# Patient Record
Sex: MALE | Race: WHITE | NOT HISPANIC OR LATINO | Employment: FULL TIME | URBAN - METROPOLITAN AREA
[De-identification: names, ages, dates, MRNs, and addresses within clinical notes are randomized per-mention and may not be internally consistent; named-entity substitution may affect disease eponyms.]

---

## 2017-04-05 ENCOUNTER — HISTORICAL (OUTPATIENT)
Dept: ADMINISTRATIVE | Facility: HOSPITAL | Age: 52
End: 2017-04-05

## 2017-04-21 LAB
ALBUMIN SERPL-MCNC: 4.5 G/DL (ref 3.1–4.7)
ALP SERPL-CCNC: 80 IU/L (ref 40–104)
ALT (SGPT): 42 IU/L (ref 3–33)
AST SERPL-CCNC: 32 IU/L (ref 10–40)
BACTERIA SPEC CULT: ABNORMAL
BASOPHILS NFR BLD: 0 K/UL (ref 0–0.2)
BASOPHILS NFR BLD: 0.4 %
BILIRUB SERPL-MCNC: 0.7 MG/DL (ref 0.3–1)
BUN SERPL-MCNC: 11 MG/DL (ref 8–20)
CALCIUM SERPL-MCNC: 9.3 MG/DL (ref 7.7–10.4)
CHLORIDE: 98 MMOL/L (ref 98–110)
CO2 SERPL-SCNC: 29.8 MMOL/L (ref 22.8–31.6)
CREATININE: 0.99 MG/DL (ref 0.6–1.4)
EOSINOPHIL NFR BLD: 0.2 K/UL (ref 0–0.7)
EOSINOPHIL NFR BLD: 2.7 %
ERYTHROCYTE [DISTWIDTH] IN BLOOD BY AUTOMATED COUNT: 11.9 % (ref 11.7–14.9)
GLUCOSE: 97 MG/DL (ref 70–99)
GRAN #: 5.6 K/UL (ref 1.4–6.5)
GRAN%: 61.9 %
HCT VFR BLD AUTO: 42.8 % (ref 39–55)
HGB BLD-MCNC: 14.8 G/DL (ref 14–16)
IMMATURE GRANS (ABS): 0 K/UL (ref 0–1)
IMMATURE GRANULOCYTES: 0.3 %
LYMPH #: 2.4 K/UL (ref 1.2–3.4)
LYMPH%: 26.3 %
MCH RBC QN AUTO: 32.2 PG (ref 25–35)
MCHC RBC AUTO-ENTMCNC: 34.6 G/DL (ref 31–36)
MCV RBC AUTO: 93 FL (ref 80–100)
MONO #: 0.8 K/UL (ref 0.1–0.6)
MONO%: 8.4 %
NUCLEATED RBCS: 0 %
PLATELET # BLD AUTO: 240 K/UL (ref 140–440)
PMV BLD AUTO: 9.7 FL (ref 8.8–12.7)
POTASSIUM SERPL-SCNC: 3.5 MMOL/L (ref 3.5–5)
PROT SERPL-MCNC: 7.9 G/DL (ref 6–8.2)
RBC # BLD AUTO: 4.6 M/UL (ref 4.3–5.9)
RBC # BLD AUTO: ABNORMAL /HPF
SODIUM: 140 MMOL/L (ref 134–144)
SQUAMOUS EPITHELIAL, UA: ABNORMAL
WBC # BLD AUTO: 9 K/UL (ref 5–10)
WBC # BLD: ABNORMAL /HPF

## 2017-05-15 ENCOUNTER — TELEPHONE (OUTPATIENT)
Dept: ORTHOPEDICS | Facility: CLINIC | Age: 52
End: 2017-05-15

## 2017-05-15 NOTE — TELEPHONE ENCOUNTER
----- Message from Elicia Bustamante sent at 5/15/2017 11:05 AM CDT -----  Contact: PATIENT  PLEASE CALL PATIENT TO SCHEDULE APPOINTMENT FOR Wednesday TO HAVE STITCHES REMOVED PHONE# [phone]

## 2017-05-19 ENCOUNTER — OFFICE VISIT (OUTPATIENT)
Dept: ORTHOPEDICS | Facility: CLINIC | Age: 52
End: 2017-05-19
Payer: OTHER GOVERNMENT

## 2017-05-19 VITALS
WEIGHT: 184 LBS | SYSTOLIC BLOOD PRESSURE: 140 MMHG | DIASTOLIC BLOOD PRESSURE: 82 MMHG | HEIGHT: 66 IN | BODY MASS INDEX: 29.57 KG/M2

## 2017-05-19 DIAGNOSIS — M77.10 LATERAL EPICONDYLITIS  OF ELBOW: Primary | ICD-10-CM

## 2017-05-19 PROCEDURE — 99024 POSTOP FOLLOW-UP VISIT: CPT | Mod: ,,, | Performed by: ORTHOPAEDIC SURGERY

## 2017-05-19 RX ORDER — TRAZODONE HYDROCHLORIDE 50 MG/1
1 TABLET ORAL NIGHTLY
COMMUNITY
Start: 2017-03-29

## 2017-05-19 RX ORDER — ESOMEPRAZOLE MAGNESIUM 40 MG/1
1 CAPSULE, DELAYED RELEASE ORAL DAILY
COMMUNITY
Start: 2017-03-29

## 2017-05-19 NOTE — PROGRESS NOTES
Subjective:     Chief Complaint  Chief Complaint   Patient presents with    Right Elbow - Post-op Evaluation, Pain     DOS: 04/27/2017, 3 weeks 1 day; Extensor Tendon repair patient in a cast.       HPI  Flynn Harden is a 51 y.o.  male who 3 weeks status post extensor tendon repair of the right elbow. His incision is healing well the cast was removed and he will be placed in a short arm cast for 3 weeks patient's been encouraged on elbow range of motion exercises      Review of Systems     Constitutional: Negative    HENT: Negative  Eyes: Negative  Respiratory: Negative  Cardiovascular: Negative  Musculoskeletal: HPI  Skin: Negative  Neurological: Negative  Hematological: Negative  Endocrine: Negative      Physical Exam    Vitals:    05/19/17 0859   BP: (!) 140/82     Physical Examination:     General appearance -  well appearing, and in no distress  Mental status - awake  Neck - supple  Chest -  symmetric air entry  Heart - normal rate   Abdomen - soft    Past Medical History  Past Medical History:   Diagnosis Date    Arthritis     back L4- L5, knee, rt foot with fractures    Cancer     basal cell       Past Surgical History  Past Surgical History:   Procedure Laterality Date    CARPAL TUNNEL RELEASE      CARPAL TUNNEL RELEASE      qubital tunnel      SHOULDER ARTHROSCOPY W/ ROTATOR CUFF REPAIR      TARSAL NAVICULAR ARTHODESIS      ULNAR TUNNEL RELEASE      VASECTOMY         Medications  Current Outpatient Prescriptions   Medication Sig    NEXIUM 40 mg capsule Take 1 tablet by mouth once daily.    trazodone (DESYREL) 50 MG tablet Take 1 tablet by mouth nightly.     No current facility-administered medications for this visit.        Allergies  Review of patient's allergies indicates:  No Known Allergies        Assessment/Plan   Lateral epicondylitis  of elbow

## 2017-05-19 NOTE — MR AVS SNAPSHOT
"    [facility] includes the [facility]  [address]  [address]  [place] [address]  Phone: [phone]  Fax: [phone]                  Flynn Harden   2017 8:30 AM   Office Visit    Description:  Male : 1965   Provider:  Michael Drummond MD   Department:  [facility]           Reason for Visit     Right Elbow - Post-op Evaluation, Pain           Diagnoses this Visit        Comments    Lateral epicondylitis  of elbow    -  Primary            To Do List           Future Appointments        Provider Department Dept Phone    2017 8:40 AM Michael Drummond MD [facility] [phone]      Goals (5 Years of Data)     None      Follow-Up and Disposition     Return in about 3 weeks (around 2017).    Follow-up and Disposition History           Medications           Message regarding Medications     Verify the changes and/or additions to your medication regime listed below are the same as discussed with your clinician today.  If any of these changes or additions are incorrect, please notify your healthcare provider.             Verify that the below list of medications is an accurate representation of the medications you are currently taking.  If none reported, the list may be blank. If incorrect, please contact your healthcare provider. Carry this list with you in case of emergency.           Current Medications     NEXIUM 40 mg capsule Take 1 tablet by mouth once daily.    trazodone (DESYREL) 50 MG tablet Take 1 tablet by mouth nightly.           Clinical Reference Information           Your Vitals Were     BP Height Weight BMI       140/82 (BP Location: Left arm, Patient Position: Sitting, BP Method: Manual) 5' 6" (1.676 m) 83.5 kg (184 lb) 29.7 kg/m2       Blood Pressure          Most Recent Value    BP  (!)  140/82      Allergies as of 2017     No Known Allergies      Immunizations Administered on Date of Encounter - " 5/19/2017     None

## 2017-06-09 ENCOUNTER — OFFICE VISIT (OUTPATIENT)
Dept: ORTHOPEDICS | Facility: CLINIC | Age: 52
End: 2017-06-09
Payer: OTHER GOVERNMENT

## 2017-06-09 VITALS
WEIGHT: 184 LBS | HEART RATE: 95 BPM | SYSTOLIC BLOOD PRESSURE: 148 MMHG | DIASTOLIC BLOOD PRESSURE: 80 MMHG | BODY MASS INDEX: 29.57 KG/M2 | HEIGHT: 66 IN

## 2017-06-09 DIAGNOSIS — M77.10 LATERAL EPICONDYLITIS  OF ELBOW: Primary | ICD-10-CM

## 2017-06-09 PROCEDURE — 99024 POSTOP FOLLOW-UP VISIT: CPT | Mod: ,,, | Performed by: ORTHOPAEDIC SURGERY

## 2017-07-21 ENCOUNTER — OFFICE VISIT (OUTPATIENT)
Dept: ORTHOPEDICS | Facility: CLINIC | Age: 52
End: 2017-07-21
Payer: OTHER GOVERNMENT

## 2017-07-21 VITALS
HEART RATE: 115 BPM | HEIGHT: 66 IN | WEIGHT: 184 LBS | SYSTOLIC BLOOD PRESSURE: 135 MMHG | DIASTOLIC BLOOD PRESSURE: 84 MMHG | BODY MASS INDEX: 29.57 KG/M2

## 2017-07-21 DIAGNOSIS — M75.111 INCOMPLETE TEAR OF RIGHT ROTATOR CUFF: ICD-10-CM

## 2017-07-21 DIAGNOSIS — M77.10 LATERAL EPICONDYLITIS  OF ELBOW: Primary | ICD-10-CM

## 2017-07-21 PROCEDURE — 99024 POSTOP FOLLOW-UP VISIT: CPT | Mod: ,,, | Performed by: ORTHOPAEDIC SURGERY

## 2017-07-21 NOTE — PROGRESS NOTES
Past Medical History:   Diagnosis Date    Arthritis     back L4- L5, knee, rt foot with fractures    Cancer     basal cell       Past Surgical History:   Procedure Laterality Date    CARPAL TUNNEL RELEASE      CARPAL TUNNEL RELEASE      ELBOW SURGERY Right 04/27/2017    Extensor Tendon Repair    EXTENSOR TENDON OF FOREARM / WRIST REPAIR Right 04/27/2017    qubital tunnel      SHOULDER ARTHROSCOPY W/ ROTATOR CUFF REPAIR      TARSAL NAVICULAR ARTHODESIS      ULNAR TUNNEL RELEASE      VASECTOMY         Current Outpatient Prescriptions   Medication Sig    NEXIUM 40 mg capsule Take 1 tablet by mouth once daily.    trazodone (DESYREL) 50 MG tablet Take 1 tablet by mouth nightly.     No current facility-administered medications for this visit.        Review of patient's allergies indicates:  No Known Allergies    Family History   Problem Relation Age of Onset    Early death Mother 30     MVA    Asthma Maternal Grandmother     Cancer Maternal Grandfather      colon, lung    Heart disease Paternal Grandfather        Social History     Social History    Marital status:      Spouse name: N/A    Number of children: N/A    Years of education: N/A     Occupational History    Not on file.     Social History Main Topics    Smoking status: Never Smoker    Smokeless tobacco: Never Used    Alcohol use Yes      Comment: daily beer    Drug use: No    Sexual activity: Yes     Partners: Female     Other Topics Concern    Not on file     Social History Narrative    No narrative on file       Chief Complaint:   Chief Complaint   Patient presents with    Right Elbow - Pain, Post-op Evaluation     DOS: 04/27/2017, 12 weeks 1 day; Extensor Tendon repair right elbow. Patient is feeling well with minor pain tender at the incision site       Consulting Physician: No ref. provider found    History of Present Illness:    This is a 51 y.o. year old male who complains of patient returns a days now about 3 months  "status post repair of extensor tendon of the right elbow he has minimal complaints of pain and he states it is improving patient also has a history of chronic right shoulder pain secondary to a low-grade rotator cuff tear he states his pain is increasing now it is 5 out of 10 knee has positive night pain      ROS    Examination:    Vital Signs:    Vitals:    07/21/17 0841   BP: 135/84   Pulse: (!) 115   Weight: 83.5 kg (184 lb)   Height: 5' 6" (1.676 m)   PainSc:   2   PainLoc: Elbow       This a well-developed, well nourished patient in no acute distress.    Alert and oriented and cooperative to examination.       Physical Exam: right Shoulder Exam     Skin  Rash:   None  Scars:   None    Inspection/Observation   Swelling:   None  Erythema:   None  Bruising:   None  Wounds:   None  Scapular Winging:  None  Scapular Dyskinesia:  None  Atrophy:   None  Masses:   None  Lymphadenopathy: None    Tenderness   AC Joint:   None    Range of Motion   Active Forward Flexion:  180   Active External Rotation:  >45  Active Internal Rotation:  Low Back    Passive Forward Flexion:  180  Passive External Rotation: >45  Passive Internal Rotation at 90 degrees: Normal    Muscle Strength   Forward Flexion:  Positive weakness  External Rotation: 5/5  Internal Rotation:  5/5    Instability:  None    Tests & Signs   Cross Arm:   Negative  Hawkin's test:   Negative  Impingement:   Mildly positive    Other   Sensation:  Normal  Pulse:    2+ radial          Imaging: X-rays ordered and reviewed today no x-rays done today but the patient has a previous MRI which shows a low-grade partial tear of his rotator cuff of the right shoulder     Assessment: status post extensor tendon repair of the right elbow and patient has a low-grade tear of the right rotator cuff of the shoulder pain is in increasing    Plan:  We'll get an MRI of theright shoulder patient's been told to increase activities in his right elbow as tolerated      DISCLAIMER: This " note may have been dictated using voice recognition software and may contain grammatical errors.     NOTE: Consult report sent to referring provider via [employer] EMR.

## 2018-01-30 ENCOUNTER — PATIENT MESSAGE (OUTPATIENT)
Dept: FAMILY MEDICINE | Facility: CLINIC | Age: 53
End: 2018-01-30